# Patient Record
(demographics unavailable — no encounter records)

---

## 2025-05-02 NOTE — ASSESSMENT
[FreeTextEntry1] : 53 year old male with elevated PSA, low free PSA. No FH prostate cancer.   The differential diagnosis of an elevated PSA (prostate specific antigen) level was discussed including prostate enlargement, prostate inflammation or infection, and prostate cancer. Discussed MRI w/wo prostate biopsy based on MRI results.  Will proceed with prostate MRI, patient will go to [facility].   - MRI prostate - RTC after MRI to discuss

## 2025-05-02 NOTE — ASSESSMENT
[FreeTextEntry1] : 53 year old male with elevated PSA, low free PSA. No FH prostate cancer.   The differential diagnosis of an elevated PSA (prostate specific antigen) level was discussed including prostate enlargement, prostate inflammation or infection, and prostate cancer. Discussed MRI w/wo prostate biopsy based on MRI results.  Will proceed with prostate MRI, patient will go to [facility].   - MRI prostate - RTC after MRI to discuss

## 2025-05-02 NOTE — HISTORY OF PRESENT ILLNESS
[FreeTextEntry1] : 53 year old male, referred by Dr. Gomes to discuss elevated PSA of 3.34 on 4/17/25. Reports PETAR was normal.   PSA: 3.34, 10% free   PSA Hx: 2023-- 3.14; 4/9/25-- 7.4; 4/17/25-- 3.34 (free 10%)  Denies FH prostate cancer. Denies bothersome LUTS, dysuria, or hematuria.   IPSS: 1/0

## 2025-05-02 NOTE — LETTER BODY
[Dear  ___] : Dear  [unfilled], [Courtesy Letter:] : I had the pleasure of seeing your patient, [unfilled], in my office today. [Please see my note below.] : Please see my note below. [Consult Closing:] : Thank you very much for allowing me to participate in the care of this patient.  If you have any questions, please do not hesitate to contact me. [Sincerely,] : Sincerely, [FreeTextEntry3] : Edmund Sotelo MD

## 2025-05-02 NOTE — ASSESSMENT
[FreeTextEntry1] : 53 year old male with elevated PSA, low free PSA. No FH prostate cancer.   The differential diagnosis of an elevated PSA (prostate specific antigen) level was discussed including prostate enlargement, prostate inflammation or infection, and prostate cancer. Discussed MRI w/wo prostate biopsy based on MRI results.  Will proceed with prostate MRI, patient will go to [facility].   - MRI prostate - RTC after MRI to discuss

## 2025-05-02 NOTE — END OF VISIT
[FreeTextEntry3] :  I, Dr. Sotelo, personally performed the evaluation and management (E/M) services for this patient. That E/M includes conducting the clinically appropriate initial history and/or exam, assessing all conditions, and establishing the plan of care. Today, my HANANE Mirela Lane NP, was here to observe my evaluation and management service for this patient and follow plan of care established by me going forward. [Time Spent: ___ minutes] : I have spent [unfilled] minutes of time on the encounter which excludes teaching and separately reported services.

## 2025-05-21 NOTE — ASSESSMENT
[FreeTextEntry1] : 53 year old male with PSA 3.34 (10% Free), MRI with 28 cc gland, 9 mm PIRADS 4 lesion in the left PZpl mid.  Prostate cancer screening: the patient and I spoke at length about prostate cancer screening, its risks and its benefits. He understands that many men with prostate cancer will die with the disease rather than of it and we also discussed the results large multi-center [place] and  prostate cancer screening trials. He also understands that PSA in and of itself does not diagnose prostate cancer but only assesses risk to a certain degree. The patient understands that to definitively screen for prostate cancer, a biopsy is required and this procedure has risks, including bleeding, infection, ED and urinary retention.  The patient opted to move forward with the biopsy, which is performed via a transperineal approach with MRI/US guided fusion targeting.  The patient is aware to expect hematuria x 2 weeks and up to 4 weeks of hematospermia.  There is a risk of infection albeit much lower than a transrectal approach. In some cases patients can experience erectile dysfunction but this is usually self limiting.  Any fever/chills after the biopsy the patient is to contact the office and go to the ER for an immediate evaluation. He has been given paper instructions outlining these items - which includes medications to avoid prior to surgery.  1. CBC, BMP, coags 2. PCP Clearance 3. TP biopsy at [facility] 4. follow up 2 weeks after biopsy with his primary urologist or ourselves. 5. we will call with the path results once they are resulted.

## 2025-05-21 NOTE — HISTORY OF PRESENT ILLNESS
[FreeTextEntry1] : 53 year old male, referred by Dr. Gomes to discuss elevated PSA of 3.34 on 4/17/25. Reports PETAR was normal.   PSA: 3.34, 10% free   PSA Hx: 2023-- 3.14; 4/9/25-- 7.4; 4/17/25-- 3.34 (free 10%)  5/16/25 MRI prostate--- 28 cc prostate with a 0.9 cm PIRADS-4 lesion at the Left mid PZPL   Denies FH prostate cancer. Denies bothersome LUTS, dysuria, or hematuria.   IPSS: 1/0

## 2025-05-21 NOTE — ASSESSMENT
[FreeTextEntry1] : 53 year old male with PSA 3.34 (10% Free), MRI with 28 cc gland, 9 mm PIRADS 4 lesion in the left PZpl mid.  Prostate cancer screening: the patient and I spoke at length about prostate cancer screening, its risks and its benefits. He understands that many men with prostate cancer will die with the disease rather than of it and we also discussed the results large multi-center [place] and  prostate cancer screening trials. He also understands that PSA in and of itself does not diagnose prostate cancer but only assesses risk to a certain degree. The patient understands that to definitively screen for prostate cancer, a biopsy is required and this procedure has risks, including bleeding, infection, ED and urinary retention.  The patient opted to move forward with the biopsy, which is performed via a transperineal approach with MRI/US guided fusion targeting.  The patient is aware to expect hematuria x 2 weeks and up to 4 weeks of hematospermia.  There is a risk of infection albeit much lower than a transrectal approach. In some cases patients can experience erectile dysfunction but this is usually self limiting.  Any fever/chills after the biopsy the patient is to contact the office and go to the ER for an immediate evaluation. He has been given paper instructions outlining these items - which includes medications to avoid prior to surgery.  1. CBC, BMP, coags 2. PCP Clearance 3. TP biopsy at [facility] 4. follow up 2 weeks after biopsy with his primary urologist or ourselves. 5. we will call with the path results once they are resulted.